# Patient Record
Sex: FEMALE | Race: WHITE | NOT HISPANIC OR LATINO | URBAN - METROPOLITAN AREA
[De-identification: names, ages, dates, MRNs, and addresses within clinical notes are randomized per-mention and may not be internally consistent; named-entity substitution may affect disease eponyms.]

---

## 2024-03-04 ENCOUNTER — OFFICE (AMBULATORY)
Dept: URBAN - METROPOLITAN AREA CLINIC 9 | Facility: CLINIC | Age: 64
End: 2024-03-04

## 2024-03-04 VITALS
RESPIRATION RATE: 14 BRPM | SYSTOLIC BLOOD PRESSURE: 122 MMHG | HEART RATE: 75 BPM | DIASTOLIC BLOOD PRESSURE: 65 MMHG | HEIGHT: 69 IN | WEIGHT: 126 LBS | OXYGEN SATURATION: 96 %

## 2024-03-04 DIAGNOSIS — R10.84 GENERALIZED ABDOMINAL PAIN: ICD-10-CM

## 2024-03-04 DIAGNOSIS — K56.50 INTESTINAL ADHESIONS [BANDS], UNSPECIFIED AS TO PARTIAL VERS: ICD-10-CM

## 2024-03-04 DIAGNOSIS — K56.609 UNSPECIFIED INTESTINAL OBSTRUCTION, UNSPECIFIED AS TO PARTIA: ICD-10-CM

## 2024-03-04 PROCEDURE — 99203 OFFICE O/P NEW LOW 30 MIN: CPT | Performed by: SPECIALIST

## 2024-03-04 NOTE — SERVICEHPINOTES
Recurrent bouts of apparent SBO obstruction.  5-6 episodes of 6-7 years.  Usually lasts a few hours and decompresses.  Asymptomatic in the interim.  Excellent workup by Dr. David Dunaway reviewed.  Normal appearing ileum on ileocolonsoscopy.  Normal MR enterography on test done while asymptomatic.  Has h/o radiation therapy for cervical cancer 25 years ago.

## 2024-03-04 NOTE — SERVICENOTES
R/O angioedema (doubt).  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  Hopefully will be able to avoid adhesion lysis surgery (which may or may not be helpful).  Conservative mgmt recommended if possible.

## 2024-06-03 ENCOUNTER — OFFICE (AMBULATORY)
Dept: URBAN - METROPOLITAN AREA CLINIC 9 | Facility: CLINIC | Age: 64
End: 2024-06-03

## 2024-06-03 VITALS
HEIGHT: 69 IN | SYSTOLIC BLOOD PRESSURE: 111 MMHG | WEIGHT: 128 LBS | HEART RATE: 89 BPM | DIASTOLIC BLOOD PRESSURE: 63 MMHG

## 2024-06-03 DIAGNOSIS — K56.609 UNSPECIFIED INTESTINAL OBSTRUCTION, UNSPECIFIED AS TO PARTIA: ICD-10-CM

## 2024-06-03 DIAGNOSIS — K56.50 INTESTINAL ADHESIONS [BANDS], UNSPECIFIED AS TO PARTIAL VERS: ICD-10-CM

## 2024-06-03 PROCEDURE — 99214 OFFICE O/P EST MOD 30 MIN: CPT | Performed by: NURSE PRACTITIONER

## 2024-06-03 NOTE — SERVICEHPINOTES
Ms. Monique presents today for follow up after recent hospitalization. She notes that she has been very strict about lqo fiber diet, but she recently ate black beans on last Thursday which was followed by the onset of abdominal pain. This is what she ate prior to the episode in December as well. She notes that she developed pain on Friday around 3:30 PM. The pain will last for hours at a time which is associated with vomiting. She had previous work-up with EGD/LO and biopsy of the terminal ileum which was overall unremarkable. Normal MR enterography on test done while asymptomatic. She has a history significant for radiation therapy for cervical cancer 25 years ago. She also had a normal recent C1 esterase.

## 2024-07-10 ENCOUNTER — OFFICE (AMBULATORY)
Dept: URBAN - METROPOLITAN AREA CLINIC 11 | Facility: CLINIC | Age: 64
End: 2024-07-10

## 2024-07-10 VITALS
DIASTOLIC BLOOD PRESSURE: 77 MMHG | SYSTOLIC BLOOD PRESSURE: 131 MMHG | WEIGHT: 129 LBS | HEART RATE: 72 BPM | OXYGEN SATURATION: 100 % | HEIGHT: 69 IN

## 2024-07-10 DIAGNOSIS — K56.50 INTESTINAL ADHESIONS [BANDS], UNSPECIFIED AS TO PARTIAL VERS: ICD-10-CM

## 2024-07-10 PROCEDURE — 99213 OFFICE O/P EST LOW 20 MIN: CPT | Performed by: INTERNAL MEDICINE

## 2025-01-15 ENCOUNTER — OFFICE (AMBULATORY)
Dept: URBAN - METROPOLITAN AREA CLINIC 11 | Facility: CLINIC | Age: 65
End: 2025-01-15

## 2025-01-15 VITALS
HEART RATE: 75 BPM | DIASTOLIC BLOOD PRESSURE: 79 MMHG | HEIGHT: 69 IN | WEIGHT: 130 LBS | SYSTOLIC BLOOD PRESSURE: 152 MMHG | OXYGEN SATURATION: 99 %

## 2025-01-15 DIAGNOSIS — R19.7 DIARRHEA, UNSPECIFIED: ICD-10-CM

## 2025-01-15 PROCEDURE — 99213 OFFICE O/P EST LOW 20 MIN: CPT | Performed by: INTERNAL MEDICINE

## 2025-01-19 LAB
ACTIN (SMOOTH MUSCLE) ANTIBODY: 2 UNITS (ref 0–19)
ALPHA-GAL IGE PANEL: CLASS DESCRIPTION: (no result)
ALPHA-GAL IGE PANEL: F026-IGE PORK: <0.1 KU/L
ALPHA-GAL IGE PANEL: F027-IGE BEEF: <0.1 KU/L
ALPHA-GAL IGE PANEL: F088-IGE LAMB: <0.1 KU/L
ALPHA-GAL IGE PANEL: IMMUNOGLOBULIN E, TOTAL: 12 IU/ML (ref 6–495)
ALPHA-GAL IGE PANEL: O215-IGE ALPHA-GAL: <0.1 KU/L
FERRITIN: 71 NG/ML (ref 15–150)
HBSAG SCREEN: NEGATIVE
HCV ANTIBODY CASCADE(PCR/GENO): HCV AB: NON REACTIVE
IMMUNOGLOBULIN A, QN, SERUM: 134 MG/DL (ref 87–352)
INTERPRETATION: (no result)
T-TRANSGLUTAMINASE (TTG) IGA: <2 U/ML

## 2025-01-23 LAB
C DIFFICILE TOXINS A+B, EIA: NEGATIVE
FECAL FAT, QUALITATIVE: FATS, NEUTRAL: NORMAL
FECAL FAT, QUALITATIVE: FATS, TOTAL: NORMAL
GIARDIA LAMBLIA AG, EIA: NEGATIVE

## 2025-03-17 ENCOUNTER — AMBULATORY SURGICAL CENTER (AMBULATORY)
Dept: URBAN - METROPOLITAN AREA SURGERY 3 | Facility: SURGERY | Age: 65
End: 2025-03-17
Payer: COMMERCIAL

## 2025-03-17 ENCOUNTER — OFFICE (AMBULATORY)
Dept: URBAN - METROPOLITAN AREA PATHOLOGY 12 | Facility: PATHOLOGY | Age: 65
End: 2025-03-17

## 2025-03-17 VITALS
SYSTOLIC BLOOD PRESSURE: 104 MMHG | DIASTOLIC BLOOD PRESSURE: 65 MMHG | RESPIRATION RATE: 18 BRPM | DIASTOLIC BLOOD PRESSURE: 52 MMHG | WEIGHT: 127 LBS | DIASTOLIC BLOOD PRESSURE: 74 MMHG | HEART RATE: 69 BPM | DIASTOLIC BLOOD PRESSURE: 57 MMHG | SYSTOLIC BLOOD PRESSURE: 110 MMHG | RESPIRATION RATE: 16 BRPM | OXYGEN SATURATION: 98 % | HEIGHT: 69 IN | DIASTOLIC BLOOD PRESSURE: 57 MMHG | SYSTOLIC BLOOD PRESSURE: 119 MMHG | OXYGEN SATURATION: 99 % | HEART RATE: 67 BPM | TEMPERATURE: 98.2 F | SYSTOLIC BLOOD PRESSURE: 130 MMHG | TEMPERATURE: 97.9 F | OXYGEN SATURATION: 100 % | DIASTOLIC BLOOD PRESSURE: 61 MMHG | SYSTOLIC BLOOD PRESSURE: 130 MMHG | RESPIRATION RATE: 18 BRPM | RESPIRATION RATE: 17 BRPM | OXYGEN SATURATION: 98 % | DIASTOLIC BLOOD PRESSURE: 65 MMHG | SYSTOLIC BLOOD PRESSURE: 119 MMHG | DIASTOLIC BLOOD PRESSURE: 52 MMHG | TEMPERATURE: 97.9 F | HEART RATE: 62 BPM | HEIGHT: 69 IN | SYSTOLIC BLOOD PRESSURE: 110 MMHG | RESPIRATION RATE: 16 BRPM | DIASTOLIC BLOOD PRESSURE: 74 MMHG | HEART RATE: 67 BPM | OXYGEN SATURATION: 100 % | SYSTOLIC BLOOD PRESSURE: 102 MMHG | DIASTOLIC BLOOD PRESSURE: 61 MMHG | OXYGEN SATURATION: 99 % | HEART RATE: 69 BPM | RESPIRATION RATE: 17 BRPM | HEART RATE: 62 BPM | WEIGHT: 127 LBS | HEART RATE: 64 BPM | SYSTOLIC BLOOD PRESSURE: 104 MMHG | TEMPERATURE: 98.2 F | HEART RATE: 64 BPM | SYSTOLIC BLOOD PRESSURE: 102 MMHG

## 2025-03-17 DIAGNOSIS — R19.7 DIARRHEA, UNSPECIFIED: ICD-10-CM

## 2025-03-17 DIAGNOSIS — K63.89 OTHER SPECIFIED DISEASES OF INTESTINE: ICD-10-CM

## 2025-03-17 PROCEDURE — 88305 TISSUE EXAM BY PATHOLOGIST: CPT | Performed by: PATHOLOGY

## 2025-03-17 PROCEDURE — 45331 SIGMOIDOSCOPY AND BIOPSY: CPT | Performed by: INTERNAL MEDICINE

## 2025-03-19 LAB
GASTRO ONE PATHOLOGY: PDF REPORT: (no result)
GASTRO ONE PATHOLOGY: PDF REPORT: (no result)